# Patient Record
Sex: FEMALE | Race: WHITE | NOT HISPANIC OR LATINO | Employment: PART TIME | URBAN - METROPOLITAN AREA
[De-identification: names, ages, dates, MRNs, and addresses within clinical notes are randomized per-mention and may not be internally consistent; named-entity substitution may affect disease eponyms.]

---

## 2017-03-14 ENCOUNTER — APPOINTMENT (OUTPATIENT)
Dept: RADIOLOGY | Facility: MEDICAL CENTER | Age: 67
End: 2017-03-14
Attending: EMERGENCY MEDICINE
Payer: MEDICARE

## 2017-03-14 ENCOUNTER — HOSPITAL ENCOUNTER (EMERGENCY)
Facility: MEDICAL CENTER | Age: 67
End: 2017-03-14
Attending: EMERGENCY MEDICINE
Payer: MEDICARE

## 2017-03-14 VITALS
RESPIRATION RATE: 14 BRPM | WEIGHT: 194 LBS | OXYGEN SATURATION: 96 % | DIASTOLIC BLOOD PRESSURE: 77 MMHG | TEMPERATURE: 97.4 F | SYSTOLIC BLOOD PRESSURE: 174 MMHG | HEIGHT: 64 IN | BODY MASS INDEX: 33.12 KG/M2 | HEART RATE: 69 BPM

## 2017-03-14 DIAGNOSIS — K76.0 HEPATIC STEATOSIS: ICD-10-CM

## 2017-03-14 DIAGNOSIS — K80.20 CALCULUS OF GALLBLADDER WITHOUT CHOLECYSTITIS WITHOUT OBSTRUCTION: ICD-10-CM

## 2017-03-14 LAB
ALBUMIN SERPL BCP-MCNC: 4.1 G/DL (ref 3.2–4.9)
ALBUMIN/GLOB SERPL: 1.4 G/DL
ALP SERPL-CCNC: 77 U/L (ref 30–99)
ALT SERPL-CCNC: 17 U/L (ref 2–50)
ANION GAP SERPL CALC-SCNC: 9 MMOL/L (ref 0–11.9)
APTT PPP: 30.8 SEC (ref 24.7–36)
AST SERPL-CCNC: 19 U/L (ref 12–45)
BASOPHILS # BLD AUTO: 0.7 % (ref 0–1.8)
BASOPHILS # BLD: 0.05 K/UL (ref 0–0.12)
BILIRUB SERPL-MCNC: 0.3 MG/DL (ref 0.1–1.5)
BNP SERPL-MCNC: 3 PG/ML (ref 0–100)
BUN SERPL-MCNC: 17 MG/DL (ref 8–22)
CALCIUM SERPL-MCNC: 9.4 MG/DL (ref 8.5–10.5)
CHLORIDE SERPL-SCNC: 104 MMOL/L (ref 96–112)
CO2 SERPL-SCNC: 25 MMOL/L (ref 20–33)
CREAT SERPL-MCNC: 0.73 MG/DL (ref 0.5–1.4)
EKG IMPRESSION: NORMAL
EOSINOPHIL # BLD AUTO: 0.35 K/UL (ref 0–0.51)
EOSINOPHIL NFR BLD: 4.6 % (ref 0–6.9)
ERYTHROCYTE [DISTWIDTH] IN BLOOD BY AUTOMATED COUNT: 43.9 FL (ref 35.9–50)
GFR SERPL CREATININE-BSD FRML MDRD: >60 ML/MIN/1.73 M 2
GLOBULIN SER CALC-MCNC: 3 G/DL (ref 1.9–3.5)
GLUCOSE SERPL-MCNC: 117 MG/DL (ref 65–99)
HCT VFR BLD AUTO: 38.7 % (ref 37–47)
HGB BLD-MCNC: 12.5 G/DL (ref 12–16)
IMM GRANULOCYTES # BLD AUTO: 0.02 K/UL (ref 0–0.11)
IMM GRANULOCYTES NFR BLD AUTO: 0.3 % (ref 0–0.9)
INR PPP: 0.93 (ref 0.87–1.13)
LIPASE SERPL-CCNC: 29 U/L (ref 11–82)
LYMPHOCYTES # BLD AUTO: 3.03 K/UL (ref 1–4.8)
LYMPHOCYTES NFR BLD: 39.7 % (ref 22–41)
MCH RBC QN AUTO: 29.3 PG (ref 27–33)
MCHC RBC AUTO-ENTMCNC: 32.3 G/DL (ref 33.6–35)
MCV RBC AUTO: 90.8 FL (ref 81.4–97.8)
MONOCYTES # BLD AUTO: 0.61 K/UL (ref 0–0.85)
MONOCYTES NFR BLD AUTO: 8 % (ref 0–13.4)
NEUTROPHILS # BLD AUTO: 3.57 K/UL (ref 2–7.15)
NEUTROPHILS NFR BLD: 46.7 % (ref 44–72)
NRBC # BLD AUTO: 0 K/UL
NRBC BLD AUTO-RTO: 0 /100 WBC
PLATELET # BLD AUTO: 452 K/UL (ref 164–446)
PMV BLD AUTO: 9 FL (ref 9–12.9)
POTASSIUM SERPL-SCNC: 3.4 MMOL/L (ref 3.6–5.5)
PROT SERPL-MCNC: 7.1 G/DL (ref 6–8.2)
PROTHROMBIN TIME: 12.8 SEC (ref 12–14.6)
RBC # BLD AUTO: 4.26 M/UL (ref 4.2–5.4)
SODIUM SERPL-SCNC: 138 MMOL/L (ref 135–145)
TROPONIN I SERPL-MCNC: <0.01 NG/ML (ref 0–0.04)
WBC # BLD AUTO: 7.6 K/UL (ref 4.8–10.8)

## 2017-03-14 PROCEDURE — 700111 HCHG RX REV CODE 636 W/ 250 OVERRIDE (IP): Performed by: EMERGENCY MEDICINE

## 2017-03-14 PROCEDURE — 80053 COMPREHEN METABOLIC PANEL: CPT

## 2017-03-14 PROCEDURE — 84484 ASSAY OF TROPONIN QUANT: CPT

## 2017-03-14 PROCEDURE — 76705 ECHO EXAM OF ABDOMEN: CPT

## 2017-03-14 PROCEDURE — 83880 ASSAY OF NATRIURETIC PEPTIDE: CPT

## 2017-03-14 PROCEDURE — 99285 EMERGENCY DEPT VISIT HI MDM: CPT

## 2017-03-14 PROCEDURE — 700102 HCHG RX REV CODE 250 W/ 637 OVERRIDE(OP): Performed by: EMERGENCY MEDICINE

## 2017-03-14 PROCEDURE — 93005 ELECTROCARDIOGRAM TRACING: CPT

## 2017-03-14 PROCEDURE — 71010 DX-CHEST-LIMITED (1 VIEW): CPT

## 2017-03-14 PROCEDURE — 96375 TX/PRO/DX INJ NEW DRUG ADDON: CPT

## 2017-03-14 PROCEDURE — 36415 COLL VENOUS BLD VENIPUNCTURE: CPT

## 2017-03-14 PROCEDURE — 700105 HCHG RX REV CODE 258: Performed by: EMERGENCY MEDICINE

## 2017-03-14 PROCEDURE — A9270 NON-COVERED ITEM OR SERVICE: HCPCS | Performed by: EMERGENCY MEDICINE

## 2017-03-14 PROCEDURE — 85610 PROTHROMBIN TIME: CPT

## 2017-03-14 PROCEDURE — 85025 COMPLETE CBC W/AUTO DIFF WBC: CPT

## 2017-03-14 PROCEDURE — 85730 THROMBOPLASTIN TIME PARTIAL: CPT

## 2017-03-14 PROCEDURE — 83690 ASSAY OF LIPASE: CPT

## 2017-03-14 PROCEDURE — 96374 THER/PROPH/DIAG INJ IV PUSH: CPT

## 2017-03-14 RX ORDER — LEVOTHYROXINE SODIUM 88 UG/1
88 TABLET ORAL
COMMUNITY

## 2017-03-14 RX ORDER — ONDANSETRON 4 MG/1
4 TABLET, ORALLY DISINTEGRATING ORAL EVERY 8 HOURS PRN
Qty: 10 TAB | Refills: 0 | Status: SHIPPED | OUTPATIENT
Start: 2017-03-14

## 2017-03-14 RX ORDER — MORPHINE SULFATE 4 MG/ML
4 INJECTION, SOLUTION INTRAMUSCULAR; INTRAVENOUS ONCE
Status: COMPLETED | OUTPATIENT
Start: 2017-03-14 | End: 2017-03-14

## 2017-03-14 RX ORDER — ESOMEPRAZOLE MAGNESIUM 40 MG/1
40 GRANULE, DELAYED RELEASE ORAL
COMMUNITY

## 2017-03-14 RX ORDER — SODIUM CHLORIDE 9 MG/ML
1000 INJECTION, SOLUTION INTRAVENOUS ONCE
Status: COMPLETED | OUTPATIENT
Start: 2017-03-14 | End: 2017-03-14

## 2017-03-14 RX ORDER — OXYCODONE HYDROCHLORIDE AND ACETAMINOPHEN 5; 325 MG/1; MG/1
1-2 TABLET ORAL EVERY 4 HOURS PRN
Qty: 30 TAB | Refills: 0 | Status: SHIPPED | OUTPATIENT
Start: 2017-03-14

## 2017-03-14 RX ORDER — ONDANSETRON 2 MG/ML
4 INJECTION INTRAMUSCULAR; INTRAVENOUS ONCE
Status: COMPLETED | OUTPATIENT
Start: 2017-03-14 | End: 2017-03-14

## 2017-03-14 RX ORDER — FLUTICASONE PROPIONATE 50 MCG
1 SPRAY, SUSPENSION (ML) NASAL DAILY
COMMUNITY

## 2017-03-14 RX ADMIN — MORPHINE SULFATE 4 MG: 4 INJECTION INTRAVENOUS at 06:34

## 2017-03-14 RX ADMIN — SODIUM CHLORIDE 1000 ML: 9 INJECTION, SOLUTION INTRAVENOUS at 06:34

## 2017-03-14 RX ADMIN — ONDANSETRON 4 MG: 2 INJECTION, SOLUTION INTRAMUSCULAR; INTRAVENOUS at 06:34

## 2017-03-14 RX ADMIN — LIDOCAINE HYDROCHLORIDE 30 ML: 20 SOLUTION OROPHARYNGEAL at 06:34

## 2017-03-14 ASSESSMENT — PAIN SCALES - GENERAL: PAINLEVEL_OUTOF10: 3

## 2017-03-14 NOTE — ED NOTES
Pt states dry mouth, ice chips given. Friend at bedside. Pt laying in bed talking on cell phone, denies other needs at this time.

## 2017-03-14 NOTE — ED AVS SNAPSHOT
OneTwoSee Access Code: [email or web address]  Expires: 4/13/2017  9:02 AM    OneTwoSee  A secure, online tool to manage your health information     [facility]’s OneTwoSee® is a secure, online tool that connects you to your personalized health information from the privacy of your home -- day or night - making it very easy for you to manage your healthcare. Once the activation process is completed, you can even access your medical information using the OneTwoSee herbert, which is available for free in the Apple Herbert store or Google Play store.     OneTwoSee provides the following levels of access (as shown below):   My Chart Features   [facility] Primary Care Doctor [facility]  Specialists [facility]  Urgent  Care Non-[facility]  Primary Care  Doctor   Email your healthcare team securely and privately 24/7 X X X X   Manage appointments: schedule your next appointment; view details of past/upcoming appointments X      Request prescription refills. X      View recent personal medical records, including lab and immunizations X X X X   View health record, including health history, allergies, medications X X X X   Read reports about your outpatient visits, procedures, consult and ER notes X X X X   See your discharge summary, which is a recap of your hospital and/or ER visit that includes your diagnosis, lab results, and care plan. X X       How to register for OneTwoSee:  1. Go to  [email or web address].  2. Click on the Sign Up Now box, which takes you to the New Member Sign Up page. You will need to provide the following information:  a. Enter your OneTwoSee Access Code exactly as it appears at the top of this page. (You will not need to use this code after you’ve completed the sign-up process. If you do not sign up before the expiration date, you must request a new code.)   b. Enter your date of birth.   c. Enter your home email address.   d. Click Submit, and follow the next screen’s instructions.  3. Create a OneTwoSee ID. This will be your OneTwoSee  login ID and cannot be changed, so think of one that is secure and easy to remember.  4. Create a [employer] password. You can change your password at any time.  5. Enter your Password Reset Question and Answer. This can be used at a later time if you forget your password.   6. Enter your e-mail address. This allows you to receive e-mail notifications when new information is available in [employer].  7. Click Sign Up. You can now view your health information.    For assistance activating your [employer] account, call [phone]

## 2017-03-14 NOTE — ED NOTES
Discharge instructions given to pt. Pt verbalized understanding. 2 prescriptions given. Pt will make follow up apt.Questions answered and pt states understanding. Pt ambulatory to home, mother will  and drive home.

## 2017-03-14 NOTE — DISCHARGE INSTRUCTIONS
"Cholelithiasis  Cholelithiasis (also called gallstones) is a form of gallbladder disease in which gallstones form in your gallbladder. The gallbladder is an organ that stores bile made in the liver, which helps digest fats. Gallstones begin as small crystals and slowly grow into stones. Gallstone pain occurs when the gallbladder spasms and a gallstone is blocking the duct. Pain can also occur when a stone passes out of the duct.   RISK FACTORS  · Being female.    · Having multiple pregnancies. Health care providers sometimes advise removing diseased gallbladders before future pregnancies.    · Being obese.  · Eating a diet heavy in fried foods and fat.    · Being older than 60 years and increasing age.    · Prolonged use of medicines containing female hormones.    · Having diabetes mellitus.    · Rapidly losing weight.    · Having a family history of gallstones (heredity).    SYMPTOMS  · Nausea.    · Vomiting.  · Abdominal pain.    · Yellowing of the skin (jaundice).    · Sudden pain. It may persist from several minutes to several hours.  · Fever.    · Tenderness to the touch.   In some cases, when gallstones do not move into the bile duct, people have no pain or symptoms. These are called \"silent\" gallstones.   TREATMENT  Silent gallstones do not need treatment. In severe cases, emergency surgery may be required. Options for treatment include:  · Surgery to remove the gallbladder. This is the most common treatment.  · Medicines. These do not always work and may take 6-12 months or more to work.  · Shock wave treatment (extracorporeal biliary lithotripsy). In this treatment an ultrasound machine sends shock waves to the gallbladder to break gallstones into smaller pieces that can pass into the intestines or be dissolved by medicine.  HOME CARE INSTRUCTIONS   · Only take over-the-counter or prescription medicines for pain, discomfort, or fever as directed by your health care provider.    · Follow a low-fat diet until " seen again by your health care provider. Fat causes the gallbladder to contract, which can result in pain.    · Follow up with your health care provider as directed. Attacks are almost always recurrent and surgery is usually required for permanent treatment.    SEEK IMMEDIATE MEDICAL CARE IF:   · Your pain increases and is not controlled by medicines.    · You have a fever or persistent symptoms for more than 2-3 days.    · You have a fever and your symptoms suddenly get worse.    · You have persistent nausea and vomiting.    MAKE SURE YOU:   · Understand these instructions.  · Will watch your condition.  · Will get help right away if you are not doing well or get worse.     This information is not intended to replace advice given to you by your health care provider. Make sure you discuss any questions you have with your health care provider.     Document Released: 12/14/2006 Document Revised: 08/20/2014 Document Reviewed: 06/11/2014  [employer] Interactive Patient Education ©2016 [employer]    Fatty Liver  Fatty liver, also called hepatic steatosis or steatohepatitis, is a condition in which too much fat has built up in your liver cells. The liver removes harmful substances from your bloodstream. It produces fluids your body needs. It also helps your body use and store energy from the food you eat.  In many cases, fatty liver does not cause symptoms or problems. It is often diagnosed when tests are being done for other reasons. However, over time, fatty liver can cause inflammation that may lead to more serious liver problems, such as scarring of the liver (cirrhosis).  CAUSES   Causes of fatty liver may include:   · Drinking too much alcohol.  · Poor nutrition.  · Obesity.  · Cushing syndrome.  · Diabetes.  · Hyperlipidemia.  · Pregnancy.  · Certain drugs.  · Poisons.  · Some viral infections.  RISK FACTORS  You may be more likely to develop fatty liver if you:  · Abuse alcohol.  · Are pregnant.  · Are  overweight.  · Have diabetes.  · Have hepatitis.  · Have a high triglyceride level.    SIGNS AND SYMPTOMS   Fatty liver often does not cause any symptoms. In cases where symptoms develop, they can include:  · Fatigue.  · Weakness.  · Weight loss.  · Confusion.    · Abdominal pain.  · Yellowing of your skin and the white parts of your eyes (jaundice).  · Nausea and vomiting.  DIAGNOSIS   Fatty liver may be diagnosed by:   · Physical exam and medical history.  · Blood tests.  · Imaging tests, such as an ultrasound, CT scan, or MRI.  · Liver biopsy. A small sample of liver tissue is removed using a needle. The sample is then looked at under a microscope.  TREATMENT   Fatty liver is often caused by other health conditions. Treatment for fatty liver may involve medicines and lifestyle changes to manage conditions such as:   · Alcoholism.  · High cholesterol.  · Diabetes.  · Being overweight or obese.    HOME CARE INSTRUCTIONS  · Eat a healthy diet as directed by your health care provider.  · Exercise regularly. This can help you lose weight and control your cholesterol and diabetes. Talk to your health care provider about an exercise plan and which activities are best for you.  · Do not drink alcohol.    · Take medicines only as directed by your health care provider.  SEEK MEDICAL CARE IF:  You have difficulty controlling your:  · Blood sugar.  · Cholesterol.  · Alcohol consumption.  SEEK IMMEDIATE MEDICAL CARE IF:  · You have abdominal pain.  · You have jaundice.  · You have nausea and vomiting.     This information is not intended to replace advice given to you by your health care provider. Make sure you discuss any questions you have with your health care provider.     Document Released: 02/02/2007 Document Revised: 01/08/2016 Document Reviewed: 04/29/2015  [employer] Interactive Patient Education ©2016 [employer]

## 2017-03-14 NOTE — ED PROVIDER NOTES
ED Provider Note    Scribed for Shree Brian D.O. by Alley Solis. 3/14/2017  5:59 AM    Primary care provider: No primary care provider on file.  Means of arrival: Walk-in  History obtained from: Patient  History limited by: None     CHIEF COMPLAINT  Chief Complaint   Patient presents with   • Chest Pain     mid sternum. woke up with pain. hx of pulmonary nodules.     HPI  Philly Cordero is a 66 y.o. female who presents to the Emergency Department for chest pain which woke her from her sleep, onset 4:00 AM this morning. Per patient, her pain is sharp in nature, 9/10 in severity and constant. This pain presents to her epigastric region and has associated nausea. Nothing improves this pain and there are no reported exacerbating factors. She has history of GERD but states this pain is different in presentation. She denies history of gall bladder disease. Patient also denies recent fever, episodes of vomiting, dysuria and diarrhea. Her father has history of congestive heart failure and atrial fibrillation.     Cardiac Risk Factors:  No Age > 65  No Aspirin use within 7 days  No prior history of coronary artery disease  No diabetes  No hyperlipidemia   No hypertension  No obesity  No family history of coronary artery disease at a young age <56 yo  No tobacco use   No drugs (methamphetamine or cocaine)  No history of aortic aneurysm   No history of aortic dissection   No history of deep vein thrombosis or pulmonary embolism   No hormone replacement  No oral birth control    REVIEW OF SYSTEMS  Pertinent positives include chest pain, nausea. Pertinent negatives include no fever, vomiting, dysuria, diarrhea.  All other systems reviewed and negative.    PAST MEDICAL HISTORY  Past Medical History   Diagnosis Date   • GERD (gastroesophageal reflux disease)    • Hypothyroid        SURGICAL HISTORY  History reviewed. No pertinent past surgical history.     SOCIAL HISTORY  Social History   Substance Use Topics   •  "Smoking status: Former Smoker     Types: Cigarettes     Start date: 01/01/1988   • Smokeless tobacco: None   • Alcohol Use: Yes      Comment: 1 per week      History   Drug Use No       FAMILY HISTORY  History reviewed. No pertinent family history.    CURRENT MEDICATIONS  Home Medications     Reviewed by Toma Romano R.N. (Registered Nurse) on 03/14/17 at 0547  Med List Status: Complete    Medication Last Dose Status    esomeprazole magnesium (NEXIUM) 40 MG Pack  Active    fluticasone (FLONASE) 50 MCG/ACT nasal spray  Active    fluticasone-salmeterol (ADVAIR DISKUS) 250-50 MCG/DOSE AEROSOL POWDER, BREATH ACTIVATED  Active    levothyroxine (SYNTHROID) 88 MCG Tab  Active                ALLERGIES  Allergies   Allergen Reactions   • Fentanyl Anaphylaxis   • Codeine Anxiety       PHYSICAL EXAM  VITAL SIGNS: /77 mmHg  Pulse 85  Temp(Src) 36.3 °C (97.4 °F)  Resp 18  Ht 1.626 m (5' 4\")  Wt 88 kg (194 lb 0.1 oz)  BMI 33.28 kg/m2  SpO2 99%    Nursing notes and vitals reviewed.  Constitutional: Well developed, Well nourished, No acute distress, Non-toxic appearance.   Eyes: PERRLA, EOMI, Conjunctiva normal, No discharge.   Cardiovascular: Normal heart rate, Normal rhythm, No murmurs, No rubs, No gallops.   Thorax & Lungs: No respiratory distress, No rales, No rhonchi, No wheezing, No chest tenderness.   Abdomen: Positive Wilder's sign. Slight epigastric tenderness. Bowel sounds normal, Soft, No guarding, No rebound, No masses, No pulsatile masses.   Skin: Warm, Dry, No erythema, No rash.   Musculoskeletal: Intact distal pulses, No edema, No cyanosis, No clubbing. Good range of motion in all major joints. No tenderness to palpation or major deformities noted, no CVA tenderness, no midline back tenderness.   Neurologic: Alert & oriented x 3, Normal motor function, Normal sensory function, No focal deficits noted.  Psychiatric: Anxious.     DIAGNOSTIC STUDIES/PROCEDURES    LABS  Results for orders placed or " performed during the hospital encounter of 03/14/17   TROPONIN   Result Value Ref Range    Troponin I <0.01 0.00 - 0.04 ng/mL   BTYPE NATRIURETIC PEPTIDE   Result Value Ref Range    B Natriuretic Peptide 3 0 - 100 pg/mL   CBC WITH DIFFERENTIAL   Result Value Ref Range    WBC 7.6 4.8 - 10.8 K/uL    RBC 4.26 4.20 - 5.40 M/uL    Hemoglobin 12.5 12.0 - 16.0 g/dL    Hematocrit 38.7 37.0 - 47.0 %    MCV 90.8 81.4 - 97.8 fL    MCH 29.3 27.0 - 33.0 pg    MCHC 32.3 (L) 33.6 - 35.0 g/dL    RDW 43.9 35.9 - 50.0 fL    Platelet Count 452 (H) 164 - 446 K/uL    MPV 9.0 9.0 - 12.9 fL    Neutrophils-Polys 46.70 44.00 - 72.00 %    Lymphocytes 39.70 22.00 - 41.00 %    Monocytes 8.00 0.00 - 13.40 %    Eosinophils 4.60 0.00 - 6.90 %    Basophils 0.70 0.00 - 1.80 %    Immature Granulocytes 0.30 0.00 - 0.90 %    Nucleated RBC 0.00 /100 WBC    Neutrophils (Absolute) 3.57 2.00 - 7.15 K/uL    Lymphs (Absolute) 3.03 1.00 - 4.80 K/uL    Monos (Absolute) 0.61 0.00 - 0.85 K/uL    Eos (Absolute) 0.35 0.00 - 0.51 K/uL    Baso (Absolute) 0.05 0.00 - 0.12 K/uL    Immature Granulocytes (abs) 0.02 0.00 - 0.11 K/uL    NRBC (Absolute) 0.00 K/uL   COMP METABOLIC PANEL   Result Value Ref Range    Sodium 138 135 - 145 mmol/L    Potassium 3.4 (L) 3.6 - 5.5 mmol/L    Chloride 104 96 - 112 mmol/L    Co2 25 20 - 33 mmol/L    Anion Gap 9.0 0.0 - 11.9    Glucose 117 (H) 65 - 99 mg/dL    Bun 17 8 - 22 mg/dL    Creatinine 0.73 0.50 - 1.40 mg/dL    Calcium 9.4 8.5 - 10.5 mg/dL    AST(SGOT) 19 12 - 45 U/L    ALT(SGPT) 17 2 - 50 U/L    Alkaline Phosphatase 77 30 - 99 U/L    Total Bilirubin 0.3 0.1 - 1.5 mg/dL    Albumin 4.1 3.2 - 4.9 g/dL    Total Protein 7.1 6.0 - 8.2 g/dL    Globulin 3.0 1.9 - 3.5 g/dL    A-G Ratio 1.4 g/dL   PROTHROMBIN TIME   Result Value Ref Range    PT 12.8 12.0 - 14.6 sec    INR 0.93 0.87 - 1.13   APTT   Result Value Ref Range    APTT 30.8 24.7 - 36.0 sec   LIPASE   Result Value Ref Range    Lipase 29 11 - 82 U/L   ESTIMATED GFR   Result Value  Ref Range    GFR If African American >60 >60 mL/min/1.73 m 2    GFR If Non African American >60 >60 mL/min/1.73 m 2   EKG (ER)   Result Value Ref Range    Report       [facility] Emergency Dept.    Test Date:  2017  Pt Name:    JEN BLAKE                 Department: ER  MRN:        [id number]                      Room:  Gender:     F                            Technician: [id number]  :        1950                   Requested By:ER TRIAGE PROTOCOL  Order #:    [id number]                    Reading MD: BERNY SULLIVAN DO    Measurements  Intervals                                Axis  Rate:       75                           P:          68  ID:         [id number]                          QRS:        57  QRSD:       86                           T:          44  QT:         396  QTc:        443    Interpretive Statements  SINUS RHYTHM  PROBABLE LEFT ATRIAL ABNORMALITY  No previous ECG available for comparison    Electronically Signed On 3- 6:33:28 PDT by BERNY SULLIVAN DO        All labs reviewed by me.    EKG  12 lead EKG interpreted by me.   Rhythm:  Normal sinus rhythm   Rate: 75  Axis: Normal  Ectopy: None  Conduction: Normal  ST Segments: No acute change  T Waves: No acute change  Q Waves: None  Clinical Impression: No ST elevation myocardial infarction.   Comparison: No old EKG for comparison.     RADIOLOGY  US-GALLBLADDER   Final Result         1. Diffuse hepatocellular disease, most commonly hepatic steatosis.      2. Cholelithiasis. No sonographic evidence of acute cholecystitis.      DX-CHEST-LIMITED (1 VIEW)   Final Result         1. No acute cardiopulmonary abnormalities are identified.        The radiologist's interpretation of all radiological studies have been reviewed by me.    COURSE & MEDICAL DECISION MAKING  Pertinent Labs & Imaging studies reviewed. (See chart for details)    5:50 AM Ordered chest x-ray, troponin, BNP, CBC, CMP, INR, APTT and lipase to evaluate  "her symptoms.     5:59 AM - Patient seen and examined at bedside. The differential diagnoses include but are not limited to: cholecystitis, ACS, pancreatitis    9:00 AM Reviewed patient's lab and radiology results, which are shown above.     This is a charming 66 y.o. female that presents with abdominal pain. Initially the patient is in with pain radiated to his chest ever EKG was completed and troponin was negative. EKG is negative for ST elevation myocardial infarction. The patient does have evidence of cholelithiasis but no evidence of cholecystitis, she is afebrile, she does not leukocytosis, she is not vomiting currently. After receiving morphine IV fluids and Zofran, on reevaluation she is completely asymptomatic. At this point time and leave the patient can be managed as an outpatient for elective surgery therefore she is making referred to Dr. Canela. She does have strict return precautions for fever, uncontrolled pain, nausea or vomiting to return to the emergency department for further evaluation and management. You have received narcotic medication while in the emergency department. Please do not drive or operate heavy machinery  within 24 hours as narcotics can disrupt your ability to concentrate or function appropriately.    I reviewed prescription monitoring program for patient's narcotic use before prescribing a scheduled drug.The patient will not drink alcohol nor drive with prescribed medications.    The patient will return for new or worsening symptoms and is stable at the time of discharge.    The patient is referred to a primary physician for blood pressure management, diabetic screening, and for all other preventative health concerns.    Vitals upon discharge:   /77 mmHg  Pulse 69  Temp(Src) 36.3 °C (97.4 °F)  Resp 14  Ht 1.626 m (5' 4\")  Wt 88 kg (194 lb 0.1 oz)  BMI 33.28 kg/m2  SpO2 96%     DISPOSITION:  Patient will be discharged home in stable condition.    FOLLOW " UP:  [facility], Emergency Dept  [address] [facility] [facility] [id number] [phone]    If symptoms worsen    [facility]  [address] [facility] [address]  [phone]  Schedule an appointment as soon as possible for a visit  for your blood pressure    Catracho Wilkins M.D.  [address]  [address]  [facility] [id number] [phone]    Schedule an appointment as soon as possible for a visit in 1 week        OUTPATIENT MEDICATIONS:  Discharge Medication List as of 3/14/2017  9:03 AM      START taking these medications    Details   oxycodone-acetaminophen (PERCOCET) 5-325 MG Tab Take 1-2 Tabs by mouth every four hours as needed., Disp-30 Tab, R-0, Print Rx Paper      ondansetron (ZOFRAN ODT) 4 MG TABLET DISPERSIBLE Take 1 Tab by mouth every 8 hours as needed., Disp-10 Tab, R-0, Print Rx Paper           FINAL IMPRESSION  1. Calculus of gallbladder without cholecystitis without obstruction    2. Hepatic steatosis          Alley VILLANUEVA (Scribe), am scribing for, and in the presence of, Shree Brian D.O    Electronically signed by: Alley Solis (Scribe), 3/14/2017    Shree VILLANUEVA D.O. personally performed the services described in this documentation, as scribed by Alley Solis in my presence, and it is both accurate and complete.    The note accurately reflects work and decisions made by me.  Shree Brian  3/14/2017  2:53 PM

## 2017-03-14 NOTE — ED AVS SNAPSHOT
3/14/2017          Philly Cordero  [address] [place] [address]    Dear Philly:    [facility] wants to ensure your discharge home is safe and you or your loved ones have had all your questions answered regarding your care after you leave the hospital.    You may receive a telephone call within two days of your discharge.  This call is to make certain you understand your discharge instructions as well as ensure we provided you with the best care possible during your stay with us.     The call will only last approximately 3-5 minutes and will be done by a nurse.    Once again, we want to ensure your discharge home is safe and that you have a clear understanding of any next steps in your care.  If you have any questions or concerns, please do not hesitate to contact us, we are here for you.  Thank you for choosing [facility] for your healthcare needs.    Sincerely,    Alverto Oconnor    [facility]

## 2017-03-14 NOTE — ED NOTES
Chief Complaint   Patient presents with   • Chest Pain     mid sternum. woke up with pain. hx of pulmonary nodules.   Pt ambulatory to triage with above complaint. Pt returned to lobby, educated on triage process, and to inform staff of any changes or concerns. Ekg completed in triage.

## 2017-03-14 NOTE — ED AVS SNAPSHOT
Home Care Instructions                                                                                                                Philly Cordero   MRN: [id number]    Department:  [facility], Emergency Dept   Date of Visit:  3/14/2017            [facility], Emergency Dept    [address] [address]    Phone:  [phone]      You were seen by     Shree Brian D.O.      Your Diagnosis Was     Calculus of gallbladder without cholecystitis without obstruction     K80.20       These are the medications you received during your hospitalization from 03/14/2017 0513 to 03/14/2017 0903     Date/Time Order Dose Route Action    03/14/2017 0634 NS infusion 1,000 mL 1,000 mL Intravenous New Bag    03/14/2017 0634 morphine (pf) 4 mg/ml injection 4 mg 4 mg Intravenous Given    03/14/2017 0634 ondansetron (ZOFRAN) syringe/vial injection 4 mg 4 mg Intravenous Given    03/14/2017 0634 hyoscyamine-maalox plus-lidocaine viscous (GI COCKTAIL) oral susp 30 mL 30 mL Oral Given      Follow-up Information     1. Follow up with [facility], Emergency Dept.    Specialty:  Emergency Medicine    Why:  If symptoms worsen    Contact information    [address] [facility] [address] [phone]        2. Schedule an appointment as soon as possible for a visit with [facility].    Why:  for your blood pressure    Contact information    [address] [address] [phone]        3. Follow up with Catracho Wilkins M.D.. Schedule an appointment as soon as possible for a visit in 1 week.    Specialties:  Surgery, Radiology    Contact information    [address]  [address]  [facility] [address] [phone]        Medication Information     Review all of your home medications and newly ordered medications with your primary doctor and/or pharmacist as soon as possible. Follow medication instructions as directed by your doctor and/or  pharmacist.     Please keep your complete medication list with you and share with your physician. Update the information when medications are discontinued, doses are changed, or new medications (including over-the-counter products) are added; and carry medication information at all times in the event of emergency situations.               Medication List      START taking these medications        Instructions    Morning Afternoon Evening Bedtime    ondansetron 4 MG Tbdp   Commonly known as:  ZOFRAN ODT        Take 1 Tab by mouth every 8 hours as needed.   Dose:  4 mg                        oxycodone-acetaminophen 5-325 MG Tabs   Commonly known as:  PERCOCET        Take 1-2 Tabs by mouth every four hours as needed.   Dose:  1-2 Tab                          ASK your doctor about these medications        Instructions    Morning Afternoon Evening Bedtime    ADVAIR DISKUS 250-50 MCG/DOSE Aepb   Generic drug:  fluticasone-salmeterol        Inhale 1 Puff by mouth every 12 hours.   Dose:  1 Puff                        fluticasone 50 MCG/ACT nasal spray   Commonly known as:  FLONASE        Spray 1 Spray in nose every day.   Dose:  1 Spray                        levothyroxine 88 MCG Tabs   Commonly known as:  SYNTHROID        Take 88 mcg by mouth Every morning on an empty stomach.   Dose:  88 mcg                        NEXIUM 40 MG Pack   Generic drug:  esomeprazole magnesium        Take 40 mg by mouth every morning before breakfast.   Dose:  40 mg                             Where to Get Your Medications      You can get these medications from any pharmacy     Bring a paper prescription for each of these medications    - ondansetron 4 MG Tbdp  - oxycodone-acetaminophen 5-325 MG Tabs            Procedures and tests performed during your visit     APTT    BTYPE NATRIURETIC PEPTIDE    CARDIAC MONITORING    CBC WITH DIFFERENTIAL    COMP METABOLIC PANEL    DX-CHEST-LIMITED (1 VIEW)    EKG (ER)    ESTIMATED GFR    LIPASE    O2  "Protocol    PROTHROMBIN TIME    SALINE LOCK    TROPONIN    US-GALLBLADDER        Discharge Instructions       Cholelithiasis  Cholelithiasis (also called gallstones) is a form of gallbladder disease in which gallstones form in your gallbladder. The gallbladder is an organ that stores bile made in the liver, which helps digest fats. Gallstones begin as small crystals and slowly grow into stones. Gallstone pain occurs when the gallbladder spasms and a gallstone is blocking the duct. Pain can also occur when a stone passes out of the duct.   RISK FACTORS  · Being female.    · Having multiple pregnancies. Health care providers sometimes advise removing diseased gallbladders before future pregnancies.    · Being obese.  · Eating a diet heavy in fried foods and fat.    · Being older than 60 years and increasing age.    · Prolonged use of medicines containing female hormones.    · Having diabetes mellitus.    · Rapidly losing weight.    · Having a family history of gallstones (heredity).    SYMPTOMS  · Nausea.    · Vomiting.  · Abdominal pain.    · Yellowing of the skin (jaundice).    · Sudden pain. It may persist from several minutes to several hours.  · Fever.    · Tenderness to the touch.   In some cases, when gallstones do not move into the bile duct, people have no pain or symptoms. These are called \"silent\" gallstones.   TREATMENT  Silent gallstones do not need treatment. In severe cases, emergency surgery may be required. Options for treatment include:  · Surgery to remove the gallbladder. This is the most common treatment.  · Medicines. These do not always work and may take 6-12 months or more to work.  · Shock wave treatment (extracorporeal biliary lithotripsy). In this treatment an ultrasound machine sends shock waves to the gallbladder to break gallstones into smaller pieces that can pass into the intestines or be dissolved by medicine.  HOME CARE INSTRUCTIONS   · Only take over-the-counter or prescription " medicines for pain, discomfort, or fever as directed by your health care provider.    · Follow a low-fat diet until seen again by your health care provider. Fat causes the gallbladder to contract, which can result in pain.    · Follow up with your health care provider as directed. Attacks are almost always recurrent and surgery is usually required for permanent treatment.    SEEK IMMEDIATE MEDICAL CARE IF:   · Your pain increases and is not controlled by medicines.    · You have a fever or persistent symptoms for more than 2-3 days.    · You have a fever and your symptoms suddenly get worse.    · You have persistent nausea and vomiting.    MAKE SURE YOU:   · Understand these instructions.  · Will watch your condition.  · Will get help right away if you are not doing well or get worse.     This information is not intended to replace advice given to you by your health care provider. Make sure you discuss any questions you have with your health care provider.     Document Released: 12/14/2006 Document Revised: 08/20/2014 Document Reviewed: 06/11/2014  [employer] Interactive Patient Education ©2016 [employer]    Fatty Liver  Fatty liver, also called hepatic steatosis or steatohepatitis, is a condition in which too much fat has built up in your liver cells. The liver removes harmful substances from your bloodstream. It produces fluids your body needs. It also helps your body use and store energy from the food you eat.  In many cases, fatty liver does not cause symptoms or problems. It is often diagnosed when tests are being done for other reasons. However, over time, fatty liver can cause inflammation that may lead to more serious liver problems, such as scarring of the liver (cirrhosis).  CAUSES   Causes of fatty liver may include:   · Drinking too much alcohol.  · Poor nutrition.  · Obesity.  · Cushing syndrome.  · Diabetes.  · Hyperlipidemia.  · Pregnancy.  · Certain drugs.  · Poisons.  · Some viral infections.  RISK  FACTORS  You may be more likely to develop fatty liver if you:  · Abuse alcohol.  · Are pregnant.  · Are overweight.  · Have diabetes.  · Have hepatitis.  · Have a high triglyceride level.    SIGNS AND SYMPTOMS   Fatty liver often does not cause any symptoms. In cases where symptoms develop, they can include:  · Fatigue.  · Weakness.  · Weight loss.  · Confusion.    · Abdominal pain.  · Yellowing of your skin and the white parts of your eyes (jaundice).  · Nausea and vomiting.  DIAGNOSIS   Fatty liver may be diagnosed by:   · Physical exam and medical history.  · Blood tests.  · Imaging tests, such as an ultrasound, CT scan, or MRI.  · Liver biopsy. A small sample of liver tissue is removed using a needle. The sample is then looked at under a microscope.  TREATMENT   Fatty liver is often caused by other health conditions. Treatment for fatty liver may involve medicines and lifestyle changes to manage conditions such as:   · Alcoholism.  · High cholesterol.  · Diabetes.  · Being overweight or obese.    HOME CARE INSTRUCTIONS  · Eat a healthy diet as directed by your health care provider.  · Exercise regularly. This can help you lose weight and control your cholesterol and diabetes. Talk to your health care provider about an exercise plan and which activities are best for you.  · Do not drink alcohol.    · Take medicines only as directed by your health care provider.  SEEK MEDICAL CARE IF:  You have difficulty controlling your:  · Blood sugar.  · Cholesterol.  · Alcohol consumption.  SEEK IMMEDIATE MEDICAL CARE IF:  · You have abdominal pain.  · You have jaundice.  · You have nausea and vomiting.     This information is not intended to replace advice given to you by your health care provider. Make sure you discuss any questions you have with your health care provider.     Document Released: 02/02/2007 Document Revised: 01/08/2016 Document Reviewed: 04/29/2015  [employer] Interactive Patient Education ©2016 [employer]            Patient Information     Patient Information    Following emergency treatment: all patient requiring follow-up care must return either to a private physician or a clinic if your condition worsens before you are able to obtain further medical attention, please return to the emergency room.     Billing Information    At [facility], we work to make the billing process streamlined for our patients.  Our Representatives are here to answer any questions you may have regarding your hospital bill.  If you have insurance coverage and have supplied your insurance information to us, we will submit a claim to your insurer on your behalf.  Should you have any questions regarding your bill, we can be reached online or by phone as follows:  Online: You are able pay your bills online or live chat with our representatives about any billing questions you may have. We are here to help Monday - Friday from 8:00am to 7:30pm and 9:00am - 12:00pm on Saturdays.  Please visit [email or web address]  for more information.   Phone:  [phone] or [phone]    Please note that your emergency physician, surgeon, pathologist, radiologist, anesthesiologist, and other specialists are not employed by [facility] and will therefore bill separately for their services.  Please contact them directly for any questions concerning their bills at the numbers below:     Emergency Physician Services:  [phone]  [facility]:  [phone]  [facility]:  [phone]  [facility]:  [phone]    1. Your final bill may vary from the amount quoted upon discharge if all procedures are not complete at that time, or if your doctor has additional procedures of which we are not aware. You will receive an additional bill if you return to the Emergency Department at [facility] for suture removal regardless of the facility of which the sutures were placed.      2. Please arrange for settlement of this account at the emergency registration.    3. All self-pay accounts are due in full at the time of treatment.  If you are unable to meet this obligation then payment is expected within 4-5 days.     4. If you have had radiology studies (CT, X-ray, Ultrasound, MRI), you have received a preliminary result during your emergency department visit. Please contact the radiology department [phone] to receive a copy of your final result. Please discuss the Final result with your primary physician or with the follow up physician provided.     Crisis Hotline:  [place] Crisis Hotline:  7-283-PORDSUA or 1-938.538.7036  [place] Crisis Hotline:    1-918.763.5228 or 720-436-9084         ED Discharge Follow Up Questions    1. In order to provide you with very good care, we would like to follow up with a phone call in the next few days.  May we have your permission to contact you?     YES /  NO    2. What is the best phone number to call you? (       )_____-__________    3. What is the best time to call you?      Morning  /  Afternoon  /  Evening                   Patient Signature:  ____________________________________________________________    Date:  ____________________________________________________________

## 2017-03-15 ENCOUNTER — PATIENT OUTREACH (OUTPATIENT)
Dept: HEALTH INFORMATION MANAGEMENT | Facility: OTHER | Age: 67
End: 2017-03-15

## 2018-11-02 ENCOUNTER — OFFICE VISIT (OUTPATIENT)
Dept: URGENT CARE | Facility: CLINIC | Age: 68
End: 2018-11-02
Payer: MEDICARE

## 2018-11-02 VITALS
HEART RATE: 79 BPM | DIASTOLIC BLOOD PRESSURE: 74 MMHG | WEIGHT: 175 LBS | RESPIRATION RATE: 16 BRPM | OXYGEN SATURATION: 99 % | HEIGHT: 64 IN | SYSTOLIC BLOOD PRESSURE: 126 MMHG | BODY MASS INDEX: 29.88 KG/M2 | TEMPERATURE: 98.2 F

## 2018-11-02 DIAGNOSIS — S05.02XA CORNEA ABRASION, LEFT, INITIAL ENCOUNTER: ICD-10-CM

## 2018-11-02 PROCEDURE — 99204 OFFICE O/P NEW MOD 45 MIN: CPT | Performed by: NURSE PRACTITIONER

## 2018-11-02 RX ORDER — PHENTERMINE HYDROCHLORIDE 37.5 MG/1
TABLET ORAL
COMMUNITY
Start: 2018-10-04

## 2018-11-02 RX ORDER — POLYMYXIN B SULFATE AND TRIMETHOPRIM 1; 10000 MG/ML; [USP'U]/ML
1 SOLUTION OPHTHALMIC EVERY 4 HOURS
Qty: 10 ML | Refills: 0 | Status: SHIPPED | OUTPATIENT
Start: 2018-11-02

## 2018-11-02 ASSESSMENT — ENCOUNTER SYMPTOMS
EYE REDNESS: 0
EYE DISCHARGE: 0
BLURRED VISION: 0

## 2018-11-02 ASSESSMENT — VISUAL ACUITY
OD_CC: 20/15
OS_CC: 20/25

## 2018-11-02 NOTE — PROGRESS NOTES
"Subjective:      Philly Cordero is a 68 y.o. female who presents with Foreign Body in Eye (x 1 week (L) eye)            This is a new problem. Pt reports she woke up 5 days ago and felt something in her left eye. She denies ever finding any foreign body in her eye. She states she feels something in her eye every time she blinks. She denies any blurry vision, drainage from eye or pain in her eye. She does wear glasses. She is staying with her aunt her in [place] and her aunt has cats. She thinks she may have gotten a cat hair in her eye.        Review of Systems   Eyes: Negative for blurred vision, discharge and redness.        FB sensation in left eye   All other systems reviewed and are negative.    Past Medical History:   Diagnosis Date   • GERD (gastroesophageal reflux disease)    • Hypothyroid     No past surgical history on file.   Social History     Social History   • Marital status:      Spouse name: N/A   • Number of children: N/A   • Years of education: N/A     Occupational History   • Not on file.     Social History Main Topics   • Smoking status: Former Smoker     Types: Cigarettes     Start date: 1/1/1988   • Smokeless tobacco: Not on file   • Alcohol use Yes      Comment: 1 per week   • Drug use: No   • Sexual activity: Not on file     Other Topics Concern   • Not on file     Social History Narrative   • No narrative on file          Objective:     /74 (BP Location: Right arm, Patient Position: Sitting, BP Cuff Size: Large adult)   Pulse 79   Temp 36.8 °C (98.2 °F) (Temporal)   Resp 16   Ht 1.626 m (5' 4\")   Wt 79.4 kg (175 lb)   SpO2 99%   BMI 30.04 kg/m²      Physical Exam   Constitutional: She is oriented to person, place, and time. Vital signs are normal. She appears well-developed and well-nourished.   HENT:   Head: Normocephalic and atraumatic.   Eyes: Pupils are equal, round, and reactive to light. EOM and lids are normal.   Slit lamp exam:       The left eye shows corneal " abrasion and fluorescein uptake.       Neck: Normal range of motion.   Cardiovascular: Normal rate and regular rhythm.    Pulmonary/Chest: Effort normal.   Musculoskeletal: Normal range of motion.   Neurological: She is alert and oriented to person, place, and time.   Skin: Skin is warm and dry. Capillary refill takes less than 2 seconds.   Psychiatric: She has a normal mood and affect. Her speech is normal and behavior is normal. Thought content normal.   Vitals reviewed.              Assessment/Plan:     1. Cornea abrasion, left, initial encounter  - polymixin-trimethoprim (POLYTRIM) 17992-8.1 UNIT/ML-% Solution; Place 1 Drop in left eye every 4 hours.  Dispense: 10 mL; Refill: 0    OTC lubricating eye drops regularly throughout the day  Cool compresses to eye  Strict ER precautions for any change in vision, pain inside eye or purulent drainage  Supportive care, differential diagnoses, and indications for immediate follow-up discussed with patient.    Pathogenesis of diagnosis discussed including typical length and natural progression.      Instructed to return to  or nearest emergency department if symptoms fail to improve, for any change in condition, further concerns, or new concerning symptoms.  Patient states understanding of the plan of care and discharge instructions.